# Patient Record
(demographics unavailable — no encounter records)

---

## 2024-10-30 NOTE — HISTORY OF PRESENT ILLNESS
[de-identified] : History of Present Illness Lipids - didn't start statin, we had agreed he could defer until BP controlled and then reconsider LFTs - was very worried and stopped alcohol except for specific occasions GI symptoms have resolved Alcohol use: - Reports drinking alcohol on special occasions, most recently during a high school reunion in [place]. - Reports drinking alcohol only twice in the past week. - Reports a significant reduction in alcohol consumption. - Reports social support from friends in managing alcohol consumption. - Denies smoking or other drug use. - Denies any thoughts of self-harm. - Not seeing a therapist and not attending AA meetings. Weight: - Reports he has gained some weight back due to improved eating habits. Sleep: - Reports improved sleep, with less frequent awakenings during the night. Blood pressure: - Has been taking prescribed medication for blood pressure and BP is now under control. Depression: - Has been taking prescribed medication (paroxetine) for depression. - Reports some improvement in symptoms of depression and anxiety, but also reports sexual side effects (erection difficulties). Employment: - Reports leaving his job and currently seeking employment, which is causing significant stress.

## 2024-10-30 NOTE — HEALTH RISK ASSESSMENT
[Yes] : Yes [2 - 4 times a month (2 pts)] : 2-4 times a month (2 points) [1 or 2 (0 pts)] : 1 or 2 (0 points) [Less than monthly (1 pt)] : Less than monthly (1 point) [No] : In the past 12 months have you used drugs other than those required for medical reasons? No [1] : 2) Feeling down, depressed, or hopeless for several days (1) [PHQ-2 Positive] : PHQ-2 Positive [Several Days (1)] : 2.) Feeling down, depressed or hopeless? Several days [Not at All (0)] : 9.) Thoughts that you would be off dead or of hurting yourself in some way? Not at all [I have developed a follow-up plan documented below in the note.] : I have developed a follow-up plan documented below in the note. [[id number]] : 2

## 2024-10-30 NOTE — HISTORY OF PRESENT ILLNESS
[de-identified] : History of Present Illness Lipids - didn't start statin, we had agreed he could defer until BP controlled and then reconsider LFTs - was very worried and stopped alcohol except for specific occasions GI symptoms have resolved Alcohol use: - Reports drinking alcohol on special occasions, most recently during a high school reunion in [place]. - Reports drinking alcohol only twice in the past week. - Reports a significant reduction in alcohol consumption. - Reports social support from friends in managing alcohol consumption. - Denies smoking or other drug use. - Denies any thoughts of self-harm. - Not seeing a therapist and not attending AA meetings. Weight: - Reports he has gained some weight back due to improved eating habits. Sleep: - Reports improved sleep, with less frequent awakenings during the night. Blood pressure: - Has been taking prescribed medication for blood pressure and BP is now under control. Depression: - Has been taking prescribed medication (paroxetine) for depression. - Reports some improvement in symptoms of depression and anxiety, but also reports sexual side effects (erection difficulties). Employment: - Reports leaving his job and currently seeking employment, which is causing significant stress.

## 2024-10-30 NOTE — ASSESSMENT
[FreeTextEntry1] : Assessment 1. Alcohol use disorder: - Significant reduction in alcohol intake, with patient reporting only sporadic consumption. 2. Weight: - Stable weight since last visit, despite patient's subjective perception of weight gain. 3. Sleep: - Improvement in sleep quality, likely attributable to decreased alcohol intake. 4. Blood pressure: - Blood pressure well-controlled on current medication. 5. Cholesterol: - Patient has not yet started cholesterol medication, pending consultation. 6. Depression: - Some improvement in symptoms with paroxetine, but patient reports adverse sexual effects.  ED From UpToDate: For male who are treated with an SSR that improves their ri but induces sexual dysfunction, we recommend adjunctive treatment with a phosphodiesterase-5 inhibitor, based upon multiple randomized trials.  Continue SSRI. 7. Occupational stress: - Patient is currently unemployed and actively seeking employment, resulting in significant stress.  Plan 1. Alcohol use: - Advise continued reduction in ethanol intake. - Discuss strategies for managing ethanol intake during the upcoming holiday season. 2. Weight: - Encourage continued healthy eating habits. 3. Sleep: - No changes to current management plan. 4. Blood pressure: - Continue current medication. - Consider tapering dosage after six months if patient maintains reduced ethanol intake.  Discussed goal of /80 or less, frequency of BP check, encouraged low salt f=diet, exercise and weight within BMI 20-25.  Adherence to current medications reinforced. 5. Cholesterol: - Plan to recheck lipid profile before initiating pharmacotherapy, as reduced ethanol intake may have improved lipid levels.  Will reassess indications then. 6. Depression: - Add daily low-dose cialis to offset sexual side effects of paroxetine (no change in libido, just ED) - Continue current dose of paroxetine. 7. Employment stress: - Advise patient to seek psychosocial support as needed during employment search. 8. Misc: - Schedule follow-up visit in three months (January). - Administer influenza vaccine today. - Recommend tetanus toxoid booster if patient has not received one in the past 10 years. - Continue current medications for blood pressure and depression. - Continue using antifungal nail treatment for one more month, debriding once a week with ethanol. - Order liver function tests.  ICD-10 codes (6) - Alcohol use, unspecified, uncomplicated [F10.90] - Essential (primary) hypertension [I10] - Depression, unspecified [F32.A] - Other physical and mental strain related to work [Z56.6] - Nail disorder, unspecified [L60.9] - Adverse effect of selective serotonin reuptake inhibitors, initial encounter [T43.201A]

## 2024-10-30 NOTE — HEALTH RISK ASSESSMENT
[Yes] : Yes [2 - 4 times a month (2 pts)] : 2-4 times a month (2 points) [1 or 2 (0 pts)] : 1 or 2 (0 points) [Less than monthly (1 pt)] : Less than monthly (1 point) [No] : In the past 12 months have you used drugs other than those required for medical reasons? No [1] : 2) Feeling down, depressed, or hopeless for several days (1) [PHQ-2 Positive] : PHQ-2 Positive [Several Days (1)] : 2.) Feeling down, depressed or hopeless? Several days [Not at All (0)] : 9.) Thoughts that you would be off dead or of hurting yourself in some way? Not at all [I have developed a follow-up plan documented below in the note.] : I have developed a follow-up plan documented below in the note. [[id number]] : 2

## 2024-10-30 NOTE — ASSESSMENT
[FreeTextEntry1] : Assessment 1. Alcohol use disorder: - Significant reduction in alcohol intake, with patient reporting only sporadic consumption. 2. Weight: - Stable weight since last visit, despite patient's subjective perception of weight gain. 3. Sleep: - Improvement in sleep quality, likely attributable to decreased alcohol intake. 4. Blood pressure: - Blood pressure well-controlled on current medication. 5. Cholesterol: - Patient has not yet started cholesterol medication, pending consultation. 6. Depression: - Some improvement in symptoms with paroxetine, but patient reports adverse sexual effects.  ED From UpToDate: For male who are treated with an SSR that improves their ri but induces sexual dysfunction, we recommend adjunctive treatment with a phosphodiesterase-5 inhibitor, based upon multiple randomized trials.  Continue SSRI. 7. Occupational stress: - Patient is currently unemployed and actively seeking employment, resulting in significant stress.  Plan 1. Alcohol use: - Advise continued reduction in ethanol intake. - Discuss strategies for managing ethanol intake during the upcoming holiday season. 2. Weight: - Encourage continued healthy eating habits. 3. Sleep: - No changes to current management plan. 4. Blood pressure: - Continue current medication. - Consider tapering dosage after six months if patient maintains reduced ethanol intake.  Discussed goal of /80 or less, frequency of BP check, encouraged low salt f=diet, exercise and weight within BMI 20-25.  Adherence to current medications reinforced. 5. Cholesterol: - Plan to recheck lipid profile before initiating pharmacotherapy, as reduced ethanol intake may have improved lipid levels.  Will reassess indications then. 6. Depression: - Add daily low-dose cialis to offset sexual side effects of paroxetine (no change in libido, just ED) - Continue current dose of paroxetine. 7. Employment stress: - Advise patient to seek psychosocial support as needed during employment search. 8. Misc: - Schedule follow-up visit in three months (January). - Administer influenza vaccine today. - Recommend tetanus toxoid booster if patient has not received one in the past 10 years. - Continue current medications for blood pressure and depression. - Continue using antifungal nail treatment for one more month, debriding once a week with ethanol. - Order liver function tests.  ICD-10 codes (6) - Alcohol use, unspecified, uncomplicated [F10.90] - Essential (primary) hypertension [I10] - Depression, unspecified [F32.A] - Other physical and mental strain related to work [Z56.6] - Nail disorder, unspecified [L60.9] - Adverse effect of selective serotonin reuptake inhibitors, initial encounter [T43.060A]

## 2025-03-12 NOTE — HEALTH RISK ASSESSMENT
[Yes] : Yes [3 or 4 (1 pt)] : 3 or 4  (1 point) [Less than monthly (1 pt)] : Less than monthly (1 point) [1] : 2) Feeling down, depressed, or hopeless for several days (1) [PHQ-2 Negative - No further assessment needed] : PHQ-2 Negative - No further assessment needed [Former] : Former [5-9] : 5-9 [> 15 Years] : > 15 Years [[id number]] : 2

## 2025-03-12 NOTE — HISTORY OF PRESENT ILLNESS
[de-identified] : Subjective Baldemar has been experiencing stress related to a job search and negotiations for a new position. He reports increased alcohol consumption, primarily cider, due to stress, with cravings that he believes help him relax. He drinks every other day, consuming three to four small bottles, which makes him feel tipsy. Baldemar is concerned about the impact of alcohol on his liver health and is considering medication to help with his alcohol use, though he is worried about potential liver effects.  He has a long history of intermittent heavy alcohol use and periods of abstinence. He has a history of high blood pressure and stopped taking cholesterol medication due to concerns about liver health. Baldemar quit smoking in 2008 or 2009 and occasionally experiences shortness of breath, particularly when climbing stairs. He has had skin issues, such as rashes on his arms, which he attributes to a face cream and has treated with over-the-counter cortisone cream. Baldemar had a colonoscopy two years ago, during which polyps were removed. He is also concerned about his cholesterol and liver health. He has been on antidepressants and expresses a desire to eventually discontinue them. Baldemar is focused on improving his exercise routine, aiming to increase his cardio workouts, but finds that alcohol consumption affects his energy and recovery. He experiences back pain, which he attributes to poor posture at home.

## 2025-03-12 NOTE — ASSESSMENT
[FreeTextEntry1] : 1) Alcohol Use Disorder Oral naltrexone - For patients who choose oral naltrexone, our practice is to start oral naltrexone at 50 mg per day and monitor for side effects. However, to avoid side effects, some practitioners start at 25 mg and titrate after several days. Doses up to 100 mg have been effective in clinical trials  Baldemar has been struggling with alcohol use, drinking cider and occasionally vodka, primarily due to stress and job search challenges. He acknowledges a craving for alcohol to relax and improve mood. He is considering naltrexone to aid in cessation but is concerned about liver health. Conduct liver function tests today. If results are favorable, consider starting naltrexone 50 mg daily. Discuss results and potential prescription on March 17, 2025. Encourage Baldemar to read about naltrexone to be well-informed. Risks and side effects: Discussed potential side effects of naltrexone, including nausea, headache, and dizziness, which are generally mild and improve over time. 2) Hypertension: Blood pressure remains slightly elevated. Continue current blood pressure medication. Monitor blood pressure and reassess in follow-up. Discussed goal of /80 or less, frequency of BP check, encouraged low salt f=diet, exercise and weight within BMI 20-25.  Adherence to current medications reinforced. 3) Cholesterol Management: Previous concerns about cholesterol levels. Now off cholesterol medication. Monitor cholesterol levels with today's lab tests. 4) Healthcare Maintenance: Prostate Cancer Screening: Discussed the importance of prostate cancer screening due to age. Perform a blood test for prostate cancer screening today. If results are elevated, consider further testing such as MRI and biopsy.  Risks and side effects: Informed that a positive blood test does not confirm prostate cancer but indicates the need for further testing. 5) Toenail Fungus: Possible toenail fungus noted. Resume topical treatment for toenail fungus. Avoid oral antifungal medication due to potential liver impact. 6) Shortness of Breath: Occasional shortness of breath noted, possibly related to limited physical activity, deconditioning and fitness level. Increase cardiovascular exercise and reduce alcohol intake to improve fitness and potentially alleviate symptoms.  Follow-up: Schedule follow-up appointment in four to six weeks to reassess alcohol use, blood pressure, and cholesterol management. Test results: - Liver function tests ordered - Cholesterol levels to be checked - Prostate cancer screening blood test ordered - Testosterone levels to be checked - Thyroid function test ordered Visit diagnoses suggestions (5) - Essential (primary) hypertension [I10] - Alcohol use, unspecified, uncomplicated [F10.90] - Depression, unspecified [F32.A] - Disorder of the skin and subcutaneous tissue, unspecified [L98.9] - Shortness of breath [R06.02]

## 2025-03-12 NOTE — PHYSICAL EXAM
[de-identified] :  Constitutional:  no acute distress, well nourished, well developed and well-appearing.  Eyes:  normal sclera/conjunctiva and pupils equal round and reactive to light.   ENT:  the outer ears and nose were normal in appearance.   Pulmonary:  no respiratory distress, no accessory muscle use, lungs were clear to auscultation bilaterally.   Cardiac:  normal rate, with a regular rhythm, normal S1 and S2 and no murmur heard.   Vascular:  there was no peripheral edema.   Musculoskeletal:  no joint swelling and grossly normal strength and tone.   Skin:  no rash.   Neurology:  coordination grossly intact, no focal deficits and normal gait.   Psychiatric:  memory grossly normal, the affect was normal, oriented to person, place, and time, the mood was normal and insight and judgment were intact. [de-identified] : excoriations to lower abdomen, resolving s/p steroid cream per patient [de-identified] : onychomycosis

## 2025-03-12 NOTE — REVIEW OF SYSTEMS
[Shortness Of Breath] : shortness of breath [FreeTextEntry2] : Eyes:  no discharge and no vision problems.   HEENT:  no earache, no nasal discharge and no sore throat.   Cardiovascular:  no chest pain, no palpitations, no leg claudication, no lower extremity edema and no orthopnea.   Respiratory:  no shortness of breath and no cough.   Gastrointestinal:  no abdominal pain, no constipation, diarrhea, no vomiting, no heartburn and no melena.   Genitourinary:  no dysuria, no hematuria and no frequency.   Integumentary:  no skin rash.   Neurological: no seizures  no headache.   Constitutional review of systems are otherwise negative except as noted in HPI.  [FreeTextEntry6] : METS 4 [de-identified] : transient rash to arms from face cream

## 2025-03-12 NOTE — COUNSELING
[Hazards of at-risk alcohol use discussed] : Hazards of at-risk alcohol use discussed [Strategies to reduce or eliminate alcohol use discussed] : Strategies to reduce or eliminate alcohol use discussed [Benefits of weight loss discussed] : Benefits of weight loss discussed [FreeTextEntry2] : he will quit alcohol and see how this impacts his weight

## 2025-06-02 NOTE — HISTORY OF PRESENT ILLNESS
[FreeTextEntry1] : Was unable to tolerate Naltrexone due to nausea and continues to drink episodically, up to 10 bottles wine weekly.  We discussed stages of change.  He is contemplative and would like to try cutting down but feels poorly motivated.

## 2025-06-02 NOTE — HEALTH RISK ASSESSMENT
[Yes] : Yes [4 or more  times a week (4 pts)] : 4 or more  times a week (4 points) [5 or 6 (2 pts)] : 5 or 6 (2  points) [Weekly (3 pts)] : Weekly (3 points) [No] : In the past 12 months have you used drugs other than those required for medical reasons? No

## 2025-06-02 NOTE — ASSESSMENT
[FreeTextEntry1] : 1) Discussed what would be triggers and motivating factors for him to decrease alcohol and he cites interfering with his work as a trigger and building muscle as a motivating factor to decrease alcohol (alcohol depresses testosterone levels)

## 2025-06-02 NOTE — PHYSICAL EXAM
[No Acute Distress] : no acute distress [Well Nourished] : well nourished [Alert and Oriented x3] : oriented to person, place, and time [Normal Mood] : the mood was normal [de-identified] : no alcohol

## 2025-06-02 NOTE — PHYSICAL EXAM
[No Acute Distress] : no acute distress [Well Nourished] : well nourished [Alert and Oriented x3] : oriented to person, place, and time [Normal Mood] : the mood was normal [de-identified] : no alcohol